# Patient Record
Sex: FEMALE | Race: WHITE | URBAN - METROPOLITAN AREA
[De-identification: names, ages, dates, MRNs, and addresses within clinical notes are randomized per-mention and may not be internally consistent; named-entity substitution may affect disease eponyms.]

---

## 2021-07-20 ENCOUNTER — APPOINTMENT (OUTPATIENT)
Dept: URBAN - METROPOLITAN AREA CLINIC 237 | Age: 71
Setting detail: DERMATOLOGY
End: 2021-07-20

## 2021-07-20 DIAGNOSIS — L60.8 OTHER NAIL DISORDERS: ICD-10-CM

## 2021-07-20 DIAGNOSIS — D22 MELANOCYTIC NEVI: ICD-10-CM

## 2021-07-20 PROBLEM — D22.122 MELANOCYTIC NEVI OF LEFT LOWER EYELID, INCLUDING CANTHUS: Status: ACTIVE | Noted: 2021-07-20

## 2021-07-20 PROCEDURE — OTHER PRESCRIPTION: OTHER

## 2021-07-20 PROCEDURE — OTHER OBSERVATION AND MEASURE: OTHER

## 2021-07-20 PROCEDURE — OTHER OBSERVATION: OTHER

## 2021-07-20 PROCEDURE — OTHER TREATMENT REGIMEN: OTHER

## 2021-07-20 PROCEDURE — 99203 OFFICE O/P NEW LOW 30 MIN: CPT

## 2021-07-20 PROCEDURE — OTHER COUNSELING: OTHER

## 2021-07-20 PROCEDURE — OTHER PATIENT SPECIFIC COUNSELING: OTHER

## 2021-07-20 RX ORDER — CARBIT/EQUIS XT/ETHAN/CHIT/MSM
LIQUID (ML) TOPICAL
Qty: 1 | Refills: 0 | Status: ERX | COMMUNITY
Start: 2021-07-20

## 2021-07-20 ASSESSMENT — LOCATION DETAILED DESCRIPTION DERM
LOCATION DETAILED: LEFT THUMBNAIL
LOCATION DETAILED: RIGHT SMALL FINGERNAIL
LOCATION DETAILED: LEFT SMALL FINGERNAIL
LOCATION DETAILED: LEFT INFERIOR LID MARGIN
LOCATION DETAILED: RIGHT THUMBNAIL
LOCATION DETAILED: RIGHT INDEX FINGERNAIL
LOCATION DETAILED: LEFT MIDDLE FINGERNAIL
LOCATION DETAILED: RIGHT RING FINGERNAIL
LOCATION DETAILED: LEFT RING FINGERNAIL
LOCATION DETAILED: RIGHT MIDDLE FINGERNAIL
LOCATION DETAILED: LEFT INDEX FINGERNAIL

## 2021-07-20 ASSESSMENT — LOCATION ZONE DERM
LOCATION ZONE: FINGERNAIL
LOCATION ZONE: EYELID

## 2021-07-20 ASSESSMENT — LOCATION SIMPLE DESCRIPTION DERM
LOCATION SIMPLE: LEFT INDEX FINGERNAIL
LOCATION SIMPLE: RIGHT THUMBNAIL
LOCATION SIMPLE: RIGHT INDEX FINGERNAIL
LOCATION SIMPLE: LEFT MIDDLE FINGERNAIL
LOCATION SIMPLE: RIGHT RING FINGERNAIL
LOCATION SIMPLE: LEFT RING FINGERNAIL
LOCATION SIMPLE: LEFT INFERIOR EYELID
LOCATION SIMPLE: LEFT SMALL FINGERNAIL
LOCATION SIMPLE: LEFT THUMBNAIL
LOCATION SIMPLE: RIGHT MIDDLE FINGERNAIL
LOCATION SIMPLE: RIGHT SMALL FINGERNAIL

## 2021-07-20 NOTE — PROCEDURE: PATIENT SPECIFIC COUNSELING
She has tried biotin x yrs with no benefit, and vaseline and gloves at night hasn’t helped either.  Will send Fátima to [facility] to see if it’s affordable.  She should continue to keep her nails cut short.  She should also use Cutemol q day She has tried biotin x yrs with no benefit, and vaseline and gloves at night hasn’t helped either.  Will send Fátima to [facility] to see if it’s affordable.  She should continue to keep her nails cut short.  She should also use Cutemol q day

## 2021-07-20 NOTE — HPI: NAIL DYSTROPHY
How Severe Is It?: moderate
Is This A New Presentation, Or A Follow-Up?: Nail Dystrophy
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 Pt notes nails are peeling off, splitting down the middle etc. Pt has taken biotin for several years with no improvement.

## 2021-07-20 NOTE — PROCEDURE: TREATMENT REGIMEN
Samples Given: Cutemol q day
Detail Level: Zone
Samples Given: Cutemol emollient cream (Apply after genadur is applied)
Initiate Treatment: Genadur qhs
Initiate Treatment: Genadur topical liquid \\nApply qhs to nails
Discontinue Regimen: Biotin